# Patient Record
Sex: FEMALE | Race: WHITE | NOT HISPANIC OR LATINO | Employment: PART TIME | URBAN - METROPOLITAN AREA
[De-identification: names, ages, dates, MRNs, and addresses within clinical notes are randomized per-mention and may not be internally consistent; named-entity substitution may affect disease eponyms.]

---

## 2017-02-03 ENCOUNTER — OFFICE VISIT (OUTPATIENT)
Dept: URGENT CARE | Facility: MEDICAL CENTER | Age: 32
End: 2017-02-03
Payer: COMMERCIAL

## 2017-02-03 PROCEDURE — G0382 LEV 3 HOSP TYPE B ED VISIT: HCPCS

## 2017-02-03 PROCEDURE — 99283 EMERGENCY DEPT VISIT LOW MDM: CPT

## 2017-03-01 ENCOUNTER — OFFICE VISIT (OUTPATIENT)
Dept: URGENT CARE | Facility: MEDICAL CENTER | Age: 32
End: 2017-03-01
Payer: COMMERCIAL

## 2017-03-01 PROCEDURE — 99283 EMERGENCY DEPT VISIT LOW MDM: CPT

## 2017-03-01 PROCEDURE — 93005 ELECTROCARDIOGRAM TRACING: CPT

## 2017-03-01 PROCEDURE — G0382 LEV 3 HOSP TYPE B ED VISIT: HCPCS

## 2017-03-02 LAB
ATRIAL RATE: 63 BPM
P AXIS: 36 DEGREES
PR INTERVAL: 130 MS
QRS AXIS: 5 DEGREES
QRSD INTERVAL: 92 MS
QT INTERVAL: 442 MS
QTC INTERVAL: 452 MS
T WAVE AXIS: 35 DEGREES
VENTRICULAR RATE: 63 BPM

## 2017-09-15 ENCOUNTER — APPOINTMENT (OUTPATIENT)
Dept: RADIOLOGY | Facility: MEDICAL CENTER | Age: 32
End: 2017-09-15
Payer: COMMERCIAL

## 2017-09-15 ENCOUNTER — ALLSCRIPTS OFFICE VISIT (OUTPATIENT)
Dept: OTHER | Facility: OTHER | Age: 32
End: 2017-09-15

## 2017-09-15 DIAGNOSIS — R10.2 PELVIC AND PERINEAL PAIN: ICD-10-CM

## 2017-09-15 PROCEDURE — 72170 X-RAY EXAM OF PELVIS: CPT

## 2017-11-04 NOTE — PROGRESS NOTES
Assessment  1  Eustachian tube dysfunction, right (109 54) (D94 03)    Discussion/Summary  Discussion Summary:   Use debrox as directed flonase as directed claritin as directed  Medication Side Effects Reviewed: Possible side effects of new medications were reviewed with the patient/guardian today  Understands and agrees with treatment plan: The treatment plan was reviewed with the patient/guardian  The patient/guardian understands and agrees with the treatment plan   Counseling Documentation With Imm: The patient was counseled regarding diagnostic results,-- instructions for management,-- risk factor reductions,-- prognosis,-- patient and family education,-- impressions,-- risks and benefits of treatment options,-- importance of compliance with treatment  Follow Up Instructions: Follow Up with your Primary Care Provider in 2-3 days  If your symptoms worsen, go to the nearest [place] [address] Emergency Department  Chief Complaint  1  Ear Pain   2  Headache  Chief Complaint Free Text Note Form: Patient presents with sinus pain and pressure and R ear pain times 3 days      History of Present Illness  HPI: This is a 71-year-old female complaining of right ear pain x1 day  Patient reports sinus pain and pressure, runny nose, postnasal drip   Patient denies fever/chills, nausea/vomiting, diarrhea/ constipation  Patient reports history of seasonal allergies and is currently not taking any antihistamines  Patient denies any sick contacts   Hospital Based Practices Required Assessment:   Pain Assessment   the patient states they have pain  The pain is located in the R ear  (on a scale of 0 to 10, the patient rates the pain at 5 )       Review of Systems  Focused-Female:   Constitutional: No fever, no chills, feels well, no tiredness, no recent weight gain or loss  ENT: no ear ache, no loss of hearing, no nosebleeds or nasal discharge, no sore throat or hoarseness-- and-- as noted in HPI     Cardiovascular: no complaints of slow or fast heart rate, no chest pain, no palpitations, no leg claudication or lower extremity edema  Respiratory: no complaints of shortness of breath, no wheezing, no dyspnea on exertion, no orthopnea or PND  Breasts: no complaints of breast pain, breast lump or nipple discharge  Gastrointestinal: no complaints of abdominal pain, no constipation, no nausea or diarrhea, no vomiting, no bloody stools  Genitourinary: no complaints of dysuria, no incontinence, no pelvic pain, no dysmenorrhea, no vaginal discharge or abnormal vaginal bleeding  Musculoskeletal: no complaints of arthralgia, no myalgia, no joint swelling or stiffness, no limb pain or swelling  Integumentary: no complaints of skin rash or lesion, no itching or dry skin, no skin wounds  Neurological: no complaints of headache, no confusion, no numbness or tingling, no dizziness or fainting  Active Problems   1  Asperger's disorder (299 80) (F84 5)   2  Asthma (493 90) (J45 909)   3  Cervical dysplasia (622 10) (N87 9)   4  Constipation (564 00) (K59 00)   5  Depression (311) (F32 9)   6  Encounter for contraceptive surveillance (V25 40) (Z30 40)   7  Encounter for routine gynecological examination with Papanicolaou smear of cervix   (V72 31,V76 2) (Z01 419)   8  Endometriosis (617 9) (N80 9)   9  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)   10  Female pelvic pain (625 9) (R10 2)   11  Nausea (787 02) (R11 0)   12  Pelvic pain in female (625 9) (R10 2)   13  Vertigo (780 4) (R42)    Anxiety disorder (300 00) (F41 9)          Past Medical History  1  Asthma (493 90) (J45 909)   2  Depression (311) (F32 9)   3  History of Dysmenorrhea (625 3) (N94 6)   4  History of bipolar disorder (V11 1) (Z86 59)   5  History of nausea (V12 79) (Z87 898)   6  History of Migraine (346 90) (G43 909)  Active Problems And Past Medical History Reviewed: The active problems and past medical history were reviewed and updated today        Family History  Mother    1  Family history of Diabetes   2  Family history of High cholesterol   3  Family history of Hypertension   4  Family history of Hypothyroid  Maternal Grandmother    5  Family history of Pancreatic cancer  Paternal Grandmother    10  FH: colon cancer (V16 0) (Z80 0)  Maternal Grandfather    7  Family history of Lung cancer  Family History Reviewed: The family history was reviewed and updated today  Social History   · Cultural background   · Denied: History of Drug use   · Feels safe at home   · Former smoker (X94 05) (T33 074)   · No drug use   · Occupation   · Primary spoken language English   · Racial background   · Single   · Social alcohol use (Z78 9)  Social History Reviewed: The social history was reviewed and updated today  Surgical History  1  History of Adenoidectomy   2  History of Colposcopy   3  History of Laparoscopy (Diagnostic)   4  History of Myringotomy   5  History of Tonsillectomy  Surgical History Reviewed: The surgical history was reviewed and updated today  Current Meds   1  BusPIRone HCl - 30 MG Oral Tablet; Therapy: 19LXO7122 to Recorded   2  Neurontin 300 MG Oral Capsule; Therapy: (Recorded:17Ddx3505) to Recorded   3  Premarin 1 25 MG Oral Tablet; TAKE 1 TABLET DAILY; Therapy: 72BXN5182 to (Evaluate:41Vsl2966)  Requested for: 21Mar2016; Last   Rx:21Mar2016 Ordered   4  PROzac 40 MG Oral Capsule; Therapy: 50JVR8572 to Recorded  Medication List Reviewed: The medication list was reviewed and updated today  Allergies  1  Dilaudid   2  Topamax   3  Topamax TABS    Vitals  Signs   Recorded: 60KIQ3186 02:26PM   Temperature: 98 2 F, Oral  Heart Rate: 84  Respiration: 18  Systolic: 408  Diastolic: 82  O2 Saturation: 97  Pain Scale: 5    Physical Exam    Constitutional   General appearance: No acute distress, well appearing and well nourished  Eyes   Conjunctiva and lids: No swelling, erythema or discharge      Pupils and irises: Equal, round and reactive to light  Ears, Nose, Mouth, and Throat   External inspection of ears and nose: Normal     Otoscopic examination: Abnormal  -- right TM: Retracted, no erythema, no bulging, translucent  Nasal mucosa, septum, and turbinates: Abnormal  -- clear nasal mucosa discharge bilaterally  Oropharynx: Abnormal  -- mild erythema posterior pharynx, positive postnasal drip, +1 tonsils  Pulmonary   Respiratory effort: No increased work of breathing or signs of respiratory distress  Auscultation of lungs: Clear to auscultation  Cardiovascular   Palpation of heart: Normal PMI, no thrills  Auscultation of heart: Normal rate and rhythm, normal S1 and S2, without murmurs  Examination of extremities for edema and/or varicosities: Normal     Abdomen   Abdomen: Non-tender, no masses  Liver and spleen: No hepatomegaly or splenomegaly  Lymphatic   Palpation of lymph nodes in neck: No lymphadenopathy  Musculoskeletal   Gait and station: Normal     Digits and nails: Normal without clubbing or cyanosis  Inspection/palpation of joints, bones, and muscles: Normal     Skin   Skin and subcutaneous tissue: Normal without rashes or lesions  Neurologic   Cranial nerves: Cranial nerves 2-12 intact  Reflexes: 2+ and symmetric  Sensation: No sensory loss  Psychiatric   Orientation to person, place, and time: Normal     Mood and affect: Normal        Future Appointments    Date/Time Provider Specialty Site   12/15/2017 08:45 AM HARISH Hernandez   Orthopedic [address] [place] [address] Dr White   Electronically signed by : Bhupinder Siddiqi [facility]; Nov  3 2017  8:45AM EST                       (Author)    Electronically signed by : YOHAN De La Cruz ; Nov  3 2017 10:59AM EST                       (Co-author)

## 2018-01-14 NOTE — MISCELLANEOUS
Message  Returned patient call out of percocet but was trying to use less and using motrin, is also seeing GI doctor and some pain seems related to gi issues  reviewed options and that all meds have addictive potential as patient was concerned about this  Patient desire refill on percocet for now and will continue to try to use as little as necessary but feels needs more than motrin at this time to control pain  Reports is overall better        Plan  Female pelvic pain    · Oxycodone-Acetaminophen 5-325 MG Oral Tablet; TAKE 1 TO 2 TABLETS EVERY  6 HOURS AS NEEDED FOR PAIN    Signatures   Electronically signed by : James Aguilar MD; Jun 8 2016  2:17PM EST                       (Author)

## 2018-01-14 NOTE — PROGRESS NOTES
Discussion/Summary    Patient here about 3 weeks post laparoscopic surgery  Patient had cystectomy and removal of endometriosis and fulguration  Patient is steadily recovering and had Depo-Lupron when she was last at the office  Patient feels she is recovering appropriately and will return at about 3 months for her next Depo-Lupron and at about 5 months for followup  Chief Complaint  patient here about 3 weeks post surgery      Post-Op  HPI: Patient is about 3 weeks post laparoscopy with cystectomy and fulguration and excision of endometriosis  patient feels that she is improving, pain is decreasing, she is tolerating Depo-Lupron without difficulty and has nexplanon in place  she reports that she usually takes a long time to heal and feels that this recovery course is normal for her  She feels that she is improving and has benefitted from her surgery  Post-Op Gynecologic Surgery:   HPI:   PE:   Abdominal Exam: soft, nontender and normal bowel sounds  The surgical incision site(s) was clean, dry and intact, not warm and not indurated  Assessment:   Post-op, the patient is doing well   lungs clear to auscultation bilaterally, heart RRR S1S2  Plan: Activity Restrictions: None  Review of Systems    Constitutional: No fever, no chills, feels well, no tiredness, no recent weight gain or loss  Cardiovascular: no complaints of slow or fast heart rate, no chest pain, no palpitations, no leg claudication or lower extremity edema  Respiratory: no complaints of shortness of breath, no wheezing, no dyspnea on exertion, no orthopnea or PND  Breasts: no complaints of breast pain, breast lump or nipple discharge  Gastrointestinal: no complaints of abdominal pain, no constipation, no nausea or diarrhea, no vomiting, no bloody stools  Genitourinary: pelvic pain, but no complaints of dysuria, no incontinence, no pelvic pain, no dysmenorrhea, no vaginal discharge or abnormal vaginal bleeding  Musculoskeletal: no complaints of arthralgia, no myalgia, no joint swelling or stiffness, no limb pain or swelling  Neurological: no complaints of headache, no confusion, no numbness or tingling, no dizziness or fainting  Active Problems    1  Anxiety disorder (300 00) (F41 9)   2  Asperger's disorder (299 80) (F84 5)   3  Asthma (493 90) (J45 909)   4  Cervical dysplasia (622 10) (N87 9)   5  Constipation (564 00) (K59 00)   6  Depression (311) (F32 9)   7  Encounter for routine gynecological examination with Papanicolaou smear of cervix   (V72 31,V76 2) (Z01 419,Z12 4)   8  Endometriosis (617 9) (N80 9)   9  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)   10  Female pelvic pain (625 9) (R10 2)   11  Nausea (787 02) (R11 0)   12  Pelvic pain in female (625 9) (R10 2)   13  Vertigo (780 4) (R42)    Current Meds   1  Acidophilus Probiotic TABS; Therapy: (Recorded:01Oct2015) to Recorded   2  BusPIRone HCl - 30 MG Oral Tablet; Therapy: 62OUI7405 to Recorded   3  Chelated Magnesium 100 MG Oral Tablet; Therapy: (Recorded:01Oct2015) to Recorded   4  Ibuprofen 600 MG Oral Tablet; Therapy: 93TNP8248 to Recorded   5  Ketorolac Tromethamine 10 MG Oral Tablet; TAKE 1 TABLET Every 8 hours; Therapy: 12SRZ9538 to (Evaluate:30Nov2015); Last Rx:27Nov2015 Ordered   6  Magnesium 200 MG Oral Tablet; Therapy: 56GYB9961 to Recorded   7  Meclizine HCl - 25 MG Oral Tablet; take 1 three times daily; Therapy: 02AGN8631 to (Last Charlotte Schirmer)  Requested for: 58LXG1343 Ordered   8  Metoclopramide HCl - 10 MG Oral Tablet; TAKE 1 TABLET EVERY 6 HOURS AS NEEDED; Therapy: 38FKP3494 to (Selwyn Barreto)  Requested for: 62ROV1323; Last   Rx:65Dwa0030 Ordered   9  Naproxen Sodium 550 MG Oral Tablet; take one po q 6 hours as needed for pain; Therapy: 17BDC7071 to (Last Rx:30Nov2015)  Requested for: 47IIV6686 Ordered   10  Norco 5-325 MG Oral Tablet; Therapy: [phone]) to Recorded   11   Ondansetron HCl - 4 MG Oral Tablet; take one po q 4 hours as needed; Therapy: 52IUA5542 to (Last Rx:30Nov2015)  Requested for: 36RVB1865 Ordered   12  PredniSONE TABS; Therapy: (Recorded:26Sep2015) to Recorded   13  Probiotic Oral Capsule; Therapy: 05XTW5845 to Recorded   14  PROzac 40 MG Oral Capsule; Therapy: 32UVM6066 to Recorded   15  TraMADol HCl - 50 MG Oral Tablet; Therapy: (Recorded:26Sep2015) to Recorded   16  Zofran 4 MG Oral Tablet; Therapy: (Recorded:01Oct2015) to Recorded    Allergies    1   Dilaudid    Vitals   Recorded: 90UTZ2442 67:64BO   Systolic 208, LUE, Sitting   Diastolic 78, LUE, Sitting   Height 5 ft 4 5 in   Weight 186 lb    BMI Calculated 31 43   BSA Calculated 1 91     Future Appointments    Date/Time Provider Specialty Site   04/11/2016 09:30 AM Gissel Etienne MD Obstetrics/Gynecology [address] OBGYN     Signatures   Electronically signed by : Suha Francis MD; Jan 16 2016  5:14PM EST                       (Author)

## 2018-04-11 ENCOUNTER — HOSPITAL ENCOUNTER (EMERGENCY)
Facility: HOSPITAL | Age: 33
Discharge: HOME | End: 2018-04-11
Attending: EMERGENCY MEDICINE | Admitting: EMERGENCY MEDICINE
Payer: COMMERCIAL

## 2018-04-11 VITALS
OXYGEN SATURATION: 100 % | TEMPERATURE: 98.8 F | DIASTOLIC BLOOD PRESSURE: 84 MMHG | HEART RATE: 83 BPM | SYSTOLIC BLOOD PRESSURE: 136 MMHG | RESPIRATION RATE: 16 BRPM

## 2018-04-11 DIAGNOSIS — T78.3XXA ANGIOEDEMA, INITIAL ENCOUNTER: Primary | ICD-10-CM

## 2018-04-11 PROCEDURE — 99283 EMERGENCY DEPT VISIT LOW MDM: CPT

## 2018-04-11 PROCEDURE — 96374 THER/PROPH/DIAG INJ IV PUSH: CPT

## 2018-04-11 PROCEDURE — 96375 TX/PRO/DX INJ NEW DRUG ADDON: CPT

## 2018-04-11 RX ORDER — PREDNISONE 10 MG/1
40 TABLET ORAL DAILY
Qty: 20 TABLET | Refills: 0 | Status: SHIPPED | OUTPATIENT
Start: 2018-04-11

## 2018-04-11 RX ORDER — METHYLPREDNISOLONE SODIUM SUCCINATE 125 MG/2ML
125 INJECTION, POWDER, LYOPHILIZED, FOR SOLUTION INTRAMUSCULAR; INTRAVENOUS ONCE
Status: COMPLETED | OUTPATIENT
Start: 2018-04-11 | End: 2018-04-11

## 2018-04-11 RX ORDER — FAMOTIDINE 20 MG/1
20 TABLET, FILM COATED ORAL 2 TIMES DAILY
Qty: 14 TABLET | Refills: 0 | Status: SHIPPED | OUTPATIENT
Start: 2018-04-11

## 2018-04-11 RX ORDER — ARIPIPRAZOLE 10 MG/1
2.5 TABLET ORAL DAILY
COMMUNITY

## 2018-04-11 RX ADMIN — FAMOTIDINE 20 MG: 10 INJECTION INTRAVENOUS at 18:45

## 2018-04-11 RX ADMIN — METHYLPREDNISOLONE SODIUM SUCCINATE 125 MG: 125 INJECTION, POWDER, FOR SOLUTION INTRAMUSCULAR; INTRAVENOUS at 18:45

## 2018-04-11 NOTE — DISCHARGE INSTRUCTIONS
Take bendaryl 25-50 mg (1-2 tablets) every 8 hours while swelling persists  Take Pepcid 20 mg twice a day while swelling persists  Take Prednisone as prescribed  Follow up with pmd in 3-5 days for reevaluation  Return to the ED for fevers, chills, difficulty breathing, coughing, wheezing, unable to swallow or any other concerns  Angioedema   WHAT YOU NEED TO KNOW:   Angioedema is sudden swelling caused by fluid that collects in deep layers of the skin  Swelling occurs most often on the face, lips, tongue, or throat, but it can happen anywhere on the body  Your risk for angioedema increases if you have food or insect allergies or a family history of angioedema  Emotional stress, autoimmune disorders, and medicines, such as ACE inhibitors, also increase your risk  Your symptoms may be mild, or you may develop anaphylaxis  Anaphylaxis is a sudden, life-threatening reaction that needs immediate treatment  DISCHARGE INSTRUCTIONS:   Call 911 for signs or symptoms of anaphylaxis,  such as trouble breathing, swelling in your mouth or throat, or wheezing  You may also have itching, a rash, hives, or feel like you are going to faint  Return to the emergency department if:   · You have sudden behavior changes or irritability  · You are dizzy and your heart is beating faster than usual   Contact your healthcare provider if:   · Your swelling does not improve, even after you take your medicines  · You have questions or concerns about your condition or care  Medicines:   · Antihistamines  decrease mild symptoms such as itching or a rash  · Epinephrine  is used to treat severe allergic reactions such as anaphylaxis  · Steroids: This medicine may be given to decrease redness, pain, and swelling  · Take your medicine as directed  Contact your healthcare provider if you think your medicine is not helping or if you have side effects  Tell him of her if you are allergic to any medicine   Keep a list of the medicines, vitamins, and herbs you take  Include the amounts, and when and why you take them  Bring the list or the pill bottles to follow-up visits  Carry your medicine list with you in case of an emergency  Steps to take for signs or symptoms of anaphylaxis:   · Immediately  give 1 shot of epinephrine only into the outer thigh muscle  · Leave the shot in place  as directed  Your healthcare provider may recommend you leave it in place for up to 10 seconds before you remove it  This helps make sure all of the epinephrine is delivered  · Call 911 and go to the emergency department,  even if the shot improved symptoms  Do not drive yourself  Bring the used epinephrine shot with you  Safety precautions to take if you are at risk for anaphylaxis:   · Keep 2 shots of epinephrine with you at all times  You may need a second shot, because epinephrine only works for about 20 minutes and symptoms may return  Your healthcare provider can show you and family members how to give the shot  Check the expiration date every month and replace it before it expires  · Create an action plan  Your healthcare provider can help you create a written plan that explains the allergy and an emergency plan to treat a reaction  The plan explains when to give a second epinephrine shot if symptoms return or do not improve after the first  Give copies of the action plan and emergency instructions to family members, work and school staff, and  providers  Show them how to give a shot of epinephrine  · Be careful when you exercise  If you have had exercise-induced anaphylaxis, do not exercise right after you eat  Stop exercising right away if you start to develop any signs or symptoms of anaphylaxis  You may first feel tired, warm, or have itchy skin  Hives, swelling, and severe breathing problems may develop if you continue to exercise  · Carry medical alert identification    Wear medical alert jewelry or carry a card that explains the allergy  Ask your healthcare provider where to get these items  · Keep a symptom diary  Include information about how often your symptoms occur, how long they last, and if they are mild or severe  Also keep information on what you ate, what happened, or which medicines you took before the swelling started  · Avoid triggers  Triggers include foods, medicines, and other things that you know cause symptoms  You may need to see a specialist, such as an allergist or dietitian, to learn what to avoid  Follow up with your healthcare provider as directed:  Write down your questions so you remember to ask them during your visits  © 2017 [employer] Information is for End User's use only and may not be sold, redistributed or otherwise used for commercial purposes  All illustrations and images included in CareNotes® are the copyrighted property of [employer]  or [employer]  The above information is an  only  It is not intended as medical advice for individual conditions or treatments  Talk to your doctor, nurse or pharmacist before following any medical regimen to see if it is safe and effective for you

## 2018-04-11 NOTE — ED PROVIDER NOTES
History  Chief Complaint   Patient presents with    Allergic Reaction     Pt presents w/ mouth and tongue swelling starting 3 hours ago, unknown cause, pt having difficulty speaking and states it is hard to swallow, airway intact  Pt took one dose of benadryl one hour ago     34 y/o F with Pmhx of Asperger's, asthma, who presents to the emergency department for lip swelling and tongue swelling that began approximately 3 hours prior to arrival   Endorses some mild difficulty with swallowing, but patient is still tolerating oral secretions and is not drooling  Patient denies any shortness of breath, cough, wheezing, throat swelling or pain, drooling, nausea, vomiting, diarrhea, abdominal pain  Denies any chest pain or shortness of breath  Denies any fevers or chills  Denies rash  Patient denies any new lip palms, lotions, detergents, foods, medications  She did take Benadryl 50 mg p  o  prior to arrival   States that a similar episode happened within the last month, and resolved with use of Benadryl  History provided by:  Patient   used: No        Prior to Admission Medications   Prescriptions Last Dose Informant Patient Reported? Taking? ARIPiprazole (ABILIFY) 10 mg tablet   Yes Yes   Sig: Take 2 5 mg by mouth daily   FLUoxetine HCl (PROZAC PO)   Yes Yes   Sig: Take 40 mg by mouth daily       busPIRone (BUSPAR) 15 mg tablet   Yes Yes   Sig: Take 15 mg by mouth 2 (two) times a day        Facility-Administered Medications: None       Past Medical History:   Diagnosis Date    Abdominal discomfort     Anxiety     Asperger syndrome     Asthma     Back pain     Depression     Diarrhea     Lightheadedness     Psychiatric disorder     Shortness of breath     Vomiting        Past Surgical History:   Procedure Laterality Date    APPENDECTOMY      LAPAROSCOPIC ENDOMETRIOSIS FULGURATION      MI COLONOSCOPY FLX DX W/COLLJ SPEC WHEN PFRMD N/A 6/28/2016    Procedure: COLONOSCOPY; Surgeon: Elsy Gonzalez MD;  Location: [facility]; Service: Colorectal    TONSILECTOMY, ADENOIDECTOMY, BILATERAL MYRINGOTOMY AND TUBES         History reviewed  No pertinent family history  I have reviewed and agree with the history as documented  Social History   Substance Use Topics    Smoking status: Former Smoker    Smokeless tobacco: Not on file      Comment: quit 3/4 yrs ago    Alcohol use Yes      Comment: occassional        Review of Systems   Constitutional: Negative for chills and fever  HENT: Positive for facial swelling  Negative for drooling, sore throat and trouble swallowing  Eyes: Negative  Respiratory: Negative for cough, chest tightness, shortness of breath and wheezing  Cardiovascular: Negative for chest pain, palpitations and leg swelling  Gastrointestinal: Negative for abdominal pain, constipation, diarrhea, nausea and vomiting  Genitourinary: Negative for dysuria, flank pain, frequency, hematuria and urgency  Musculoskeletal: Negative for arthralgias, back pain, gait problem, joint swelling, myalgias, neck pain and neck stiffness  Skin: Negative for color change, pallor, rash and wound  Neurological: Negative for dizziness, syncope, weakness, light-headedness, numbness and headaches  Psychiatric/Behavioral: Negative  Physical Exam  ED Triage Vitals [04/11/18 1654]   Temperature Pulse Respirations Blood Pressure SpO2   98 8 °F (37 1 °C) 72 16 128/82 96 %      Temp Source Heart Rate Source Patient Position - Orthostatic VS BP Location FiO2 (%)   Oral Monitor Sitting Left arm --      Pain Score       7           Orthostatic Vital Signs  Vitals:    04/11/18 1654 04/11/18 1938   BP: 128/82 136/84   Pulse: 72 83   Patient Position - Orthostatic VS: Sitting Sitting       Physical Exam   Constitutional: She is oriented to person, place, and time  She appears well-developed and well-nourished  HENT:   Head: Normocephalic and atraumatic     Mouth/Throat: No oropharyngeal exudate  Lower lip and tongue are both swollen  Posterior oropharynx in uvula shows no edema  Uvula is midline  No swelling underneath the tongue  No drooling  Eyes: Conjunctivae and EOM are normal  Pupils are equal, round, and reactive to light  Neck: Normal range of motion  Neck supple  Cardiovascular: Normal rate, regular rhythm and intact distal pulses  Pulmonary/Chest: Effort normal and breath sounds normal  She has no wheezes  She has no rales  Abdominal: Soft  Bowel sounds are normal  She exhibits no distension  There is no tenderness  There is no rebound and no guarding  Musculoskeletal: Normal range of motion  She exhibits no edema or tenderness  Neurological: She is alert and oriented to person, place, and time  No cranial nerve deficit or sensory deficit  She exhibits normal muscle tone  Coordination normal    Skin: Skin is warm and dry  Capillary refill takes less than 2 seconds  Psychiatric: She has a normal mood and affect  Her behavior is normal    Nursing note and vitals reviewed  ED Medications  Medications   methylPREDNISolone sodium succinate (Solu-MEDROL) injection 125 mg (125 mg Intravenous Given 4/11/18 1845)   famotidine (PEPCID) injection 20 mg (20 mg Intravenous Given 4/11/18 1845)       Diagnostic Studies  Results Reviewed     None                 No orders to display              Procedures  Procedures       Phone Contacts  ED Phone Contact    ED Course  ED Course as of Apr 12 0011 Wed Apr 11, 2018 1928 Patient's tongue swelling and lip swelling has improved  She continues to be SOB free  No drooling noted  No cough, wheezing, throat swelling at this time                                   MDM  Number of Diagnoses or Management Options  Angioedema, initial encounter:   Diagnosis management comments: 34 y/o F with Pmhx of Asperger's, asthma, who presents to the emergency department for lip swelling and tongue swelling that began approximately 3 hours prior to arrival     Differential Diagnosis includes but is not limited to: Angioedema, allergic reaction, Misael's angina, low suspicion for abscess  Patient took Benadryl 50 mg p  o  prior to arrival   Will give additional Solu-Medrol 125 mg and Pepcid IV in the emergency department in the event that this is an allergic reaction  Patient was observed in the emergency department over the course of 3 hours to ensure there was no airway compromise  Patient has been able to speak in full sentences during the entire emergency department stay  Swelling to the lips and tongue have noted to decrease over the course of observation and she feels improved  She has never admitted any shortness of breath coughing or wheezing while in the emergency department  Patient will be discharged home at this time  Close return precautions given to the patient and her relative  All questions answered  Vital signs stable at discharge  Pt re-examined and evaluated after testing and treatment  Spoke with the patient and feeling improved and sxs have resolved  Will discharge home with close f/u with pcp and instructed to return to the ED if sxs worsen or continue  Pt agrees with the plan for discharge and feels comfortable to go home with proper f/u  Advised to return for worsening or additional problems  Diagnostic tests were reviewed and questions answered  Diagnosis, care plan and treatment options were discussed  The patient understand instructions and will follow up as directed  CritCare Time    Disposition  Final diagnoses:   Angioedema, initial encounter     Time reflects when diagnosis was documented in both MDM as applicable and the Disposition within this note     Time User Action Codes Description Comment    4/11/2018  7:35 PM Марина Hurst 285  3XXA] Angioedema, initial encounter       ED Disposition     ED Disposition Condition Comment    Discharge  Soraida Pel discharge to home/self care     Condition at discharge: Good        Follow-up Information     Follow up With Specialties Details Why Shalonda Dixon MD  In 3 days  [address]  [address] [address]  [phone]          Discharge Medication List as of 4/11/2018  7:40 PM      START taking these medications    Details   famotidine (PEPCID) 20 mg tablet Take 1 tablet (20 mg total) by mouth 2 (two) times a day, Starting Wed 4/11/2018, Print      predniSONE 10 mg tablet Take 4 tablets (40 mg total) by mouth daily Day 1, 2: 4 tabs daily, Day 3, 4: 3 tabs daily  Day 5, 6: 2 tabs daily  Day 7, 8: 1 tab daily  , Starting Wed 4/11/2018, Print         CONTINUE these medications which have NOT CHANGED    Details   ARIPiprazole (ABILIFY) 10 mg tablet Take 2 5 mg by mouth daily, Historical Med      busPIRone (BUSPAR) 15 mg tablet Take 15 mg by mouth 2 (two) times a day  , Historical Med      FLUoxetine HCl (PROZAC PO) Take 40 mg by mouth daily  , Until Discontinued, Historical Med           No discharge procedures on file      ED Provider  Electronically Signed by           Hollie Campos PA-C  04/12/18 0011

## 2018-04-12 NOTE — ED NOTES
Clarification of prednisone prescription  10 mg tablets take 2 twice a day for 3 days then 1 twice a day for 3 days then 1 daily for 3 days with no refills       Jeannette Perdomo PA-C  04/12/18 1020

## 2021-05-19 ENCOUNTER — TELEPHONE (OUTPATIENT)
Dept: OBGYN CLINIC | Facility: CLINIC | Age: 36
End: 2021-05-19

## 2021-05-25 ENCOUNTER — TELEPHONE (OUTPATIENT)
Dept: OBGYN CLINIC | Facility: CLINIC | Age: 36
End: 2021-05-25

## 2021-05-25 NOTE — TELEPHONE ENCOUNTER
Patient calling to verify that we received her medical records request and was sent to [facility]  Patient states was about 5 weeks ago  Aware [facility] does take a while to complete, gave patient the number to call [facility] and aware will have Francisca Mcgowan check to see if we received from patient and sent to [facility] as well  Patient states she mailed in the request to us